# Patient Record
(demographics unavailable — no encounter records)

---

## 2019-01-01 NOTE — RADIOLOGY REPORT (SQ)
EXAM DESCRIPTION:  U/S ECHOENCEPHALOGRAPHY



COMPLETED DATE/TIME:  2019 1:20 pm



REASON FOR STUDY:  Prematurity and frequent ankita and apnea



COMPARISON:  None.



TECHNIQUE:  Gray-scale sonography of the brain was performed using the anterior fontanel as a window.




LIMITATIONS:  None.



FINDINGS:  BRAIN: The ventricles and sulci are unremarkable.  No hydrocephalus.  There is no evidence
 of intracranial or subependymal hemorrhage.  No mass effect or midline shift.  The echotexture of th
e brain parenchyma is within normal limits.

OTHER: No other significant finding.



IMPRESSION:  NORMAL HEAD SONOGRAM.



TECHNICAL DOCUMENTATION:  JOB ID:  [id number]

 2011 [employer]- All Rights Reserved



Reading location - IP/workstation name: [facility]

## 2019-01-01 NOTE — EKG REPORT
SEVERITY:- OTHERWISE NORMAL ECG -

-------------------- PEDIATRIC ECG INTERPRETATION --------------------

THERE ARE TWO PREMATURE BEATS OF NORMAL QRS MORPHOLOGY PROBABLY PAC AND NOT PAC ALTHOUGH PREMATURE JU
NCTIONAL BEATS ARE NOT EXCLUDED.  SCALAR EKG IS NORMAL

:

Confirmed by: Rahul Adamson MD 2019 13:59:49

## 2019-01-01 NOTE — CIRCUMCISION NOTE
=================================================================

Circumcision Note

=================================================================

Datetime Report Generated by CPN: 2019 17:21

   

   

=================================================================

PROCEDURE INFORMATION

=================================================================

   

Circumcision Date/Time:  2019 12:13

Provider Procedure Note:  Consent obtained. Site prepped with

   Chlorhexidine and draped in usual sterile fashion. Sweetease

   administered for comfort. 0.8 ml of 1% lidocaine used for dorsal

   penile block. Mogen used to excise redundant foreskin. Patient

   tolerated procedure well with excellent cosmetic outcome. Excellent

   hemostasis obtained. Vaseline gauze dressing applied.

   

=================================================================

SIGNATURE

=================================================================

   

Signature:  Electronically signed by Leny Jaimes MD ([email or web address]) on

   2019 at 12:13  with User ID: [id number]